# Patient Record
Sex: MALE | Race: ASIAN | NOT HISPANIC OR LATINO | URBAN - METROPOLITAN AREA
[De-identification: names, ages, dates, MRNs, and addresses within clinical notes are randomized per-mention and may not be internally consistent; named-entity substitution may affect disease eponyms.]

---

## 2020-08-18 ENCOUNTER — TELEPHONE ENCOUNTER (OUTPATIENT)
Dept: URBAN - METROPOLITAN AREA SURGERY CENTER 18 | Facility: SURGERY CENTER | Age: 45
End: 2020-08-18

## 2020-08-18 RX ORDER — TENOFOVIR ALAFENAMIDE 25 MG/1
1 TABLET WITH FOOD TABLET ORAL ONCE A DAY
Qty: 90 TABLET | Refills: 3

## 2020-11-03 ENCOUNTER — OFFICE VISIT (OUTPATIENT)
Dept: URBAN - METROPOLITAN AREA TELEHEALTH 2 | Facility: TELEHEALTH | Age: 45
End: 2020-11-03
Payer: COMMERCIAL

## 2020-11-03 DIAGNOSIS — B19.10 HEPATITIS B: ICD-10-CM

## 2020-11-03 DIAGNOSIS — K76.0 FATTY LIVER: ICD-10-CM

## 2020-11-03 DIAGNOSIS — K82.4 GALLBLADDER POLYP: ICD-10-CM

## 2020-11-03 DIAGNOSIS — B18.1 CARRIER OF HEPATITIS B: ICD-10-CM

## 2020-11-03 DIAGNOSIS — N18.9 CHRONIC RENAL INSUFFICIENCY: ICD-10-CM

## 2020-11-03 PROCEDURE — 87517 HEPATITIS B DNA QUANT: CPT | Performed by: INTERNAL MEDICINE

## 2020-11-03 PROCEDURE — G8417 CALC BMI ABV UP PARAM F/U: HCPCS | Performed by: INTERNAL MEDICINE

## 2020-11-03 PROCEDURE — G9903 PT SCRN TBCO ID AS NON USER: HCPCS | Performed by: INTERNAL MEDICINE

## 2020-11-03 PROCEDURE — 99213 OFFICE O/P EST LOW 20 MIN: CPT | Performed by: INTERNAL MEDICINE

## 2020-11-03 PROCEDURE — G8427 DOCREV CUR MEDS BY ELIG CLIN: HCPCS | Performed by: INTERNAL MEDICINE

## 2020-11-03 PROCEDURE — G8483 FLU IMM NO ADMIN DOC REA: HCPCS | Performed by: INTERNAL MEDICINE

## 2020-11-03 PROCEDURE — 1036F TOBACCO NON-USER: CPT | Performed by: INTERNAL MEDICINE

## 2020-11-03 RX ORDER — TENOFOVIR ALAFENAMIDE 25 MG/1
1 TABLET WITH FOOD TABLET ORAL ONCE A DAY
Qty: 90 TABLET | Refills: 3 | Status: ACTIVE | COMMUNITY

## 2021-03-30 ENCOUNTER — TELEPHONE ENCOUNTER (OUTPATIENT)
Dept: URBAN - METROPOLITAN AREA CLINIC 96 | Facility: CLINIC | Age: 46
End: 2021-03-30

## 2021-05-03 ENCOUNTER — LAB OUTSIDE AN ENCOUNTER (OUTPATIENT)
Dept: URBAN - METROPOLITAN AREA TELEHEALTH 2 | Facility: TELEHEALTH | Age: 46
End: 2021-05-03

## 2021-08-10 ENCOUNTER — TELEPHONE ENCOUNTER (OUTPATIENT)
Dept: URBAN - METROPOLITAN AREA CLINIC 98 | Facility: CLINIC | Age: 46
End: 2021-08-10

## 2021-08-10 RX ORDER — TENOFOVIR ALAFENAMIDE 25 MG/1
1 TABLET WITH FOOD TABLET ORAL ONCE A DAY
Qty: 90 | Refills: 0
End: 2021-11-08

## 2021-08-31 ENCOUNTER — OFFICE VISIT (OUTPATIENT)
Dept: URBAN - METROPOLITAN AREA CLINIC 98 | Facility: CLINIC | Age: 46
End: 2021-08-31

## 2021-10-11 ENCOUNTER — OFFICE VISIT (OUTPATIENT)
Dept: URBAN - METROPOLITAN AREA CLINIC 98 | Facility: CLINIC | Age: 46
End: 2021-10-11

## 2021-10-28 ENCOUNTER — TELEPHONE ENCOUNTER (OUTPATIENT)
Dept: URBAN - METROPOLITAN AREA CLINIC 98 | Facility: CLINIC | Age: 46
End: 2021-10-28

## 2021-10-28 RX ORDER — TENOFOVIR ALAFENAMIDE 25 MG/1
1 TABLET WITH FOOD TABLET ORAL ONCE A DAY
Qty: 90 | Refills: 3

## 2021-11-22 ENCOUNTER — OFFICE VISIT (OUTPATIENT)
Dept: URBAN - METROPOLITAN AREA CLINIC 98 | Facility: CLINIC | Age: 46
End: 2021-11-22

## 2022-01-21 ENCOUNTER — DASHBOARD ENCOUNTERS (OUTPATIENT)
Age: 47
End: 2022-01-21

## 2022-01-21 ENCOUNTER — OFFICE VISIT (OUTPATIENT)
Dept: URBAN - METROPOLITAN AREA CLINIC 98 | Facility: CLINIC | Age: 47
End: 2022-01-21
Payer: COMMERCIAL

## 2022-01-21 ENCOUNTER — LAB OUTSIDE AN ENCOUNTER (OUTPATIENT)
Dept: URBAN - METROPOLITAN AREA CLINIC 98 | Facility: CLINIC | Age: 47
End: 2022-01-21

## 2022-01-21 ENCOUNTER — WEB ENCOUNTER (OUTPATIENT)
Dept: URBAN - METROPOLITAN AREA CLINIC 98 | Facility: CLINIC | Age: 47
End: 2022-01-21

## 2022-01-21 VITALS
DIASTOLIC BLOOD PRESSURE: 91 MMHG | HEART RATE: 81 BPM | HEIGHT: 69 IN | SYSTOLIC BLOOD PRESSURE: 128 MMHG | TEMPERATURE: 97.5 F | BODY MASS INDEX: 31.61 KG/M2 | WEIGHT: 213.4 LBS

## 2022-01-21 DIAGNOSIS — B19.10 HEPATITIS B: ICD-10-CM

## 2022-01-21 DIAGNOSIS — Z20.822 EXPOSURE TO COVID-19 VIRUS: ICD-10-CM

## 2022-01-21 DIAGNOSIS — K76.0 FATTY LIVER: ICD-10-CM

## 2022-01-21 DIAGNOSIS — Z12.11 COLON CANCER SCREENING: ICD-10-CM

## 2022-01-21 DIAGNOSIS — K82.4 GALLBLADDER POLYP: ICD-10-CM

## 2022-01-21 DIAGNOSIS — N18.9 CHRONIC RENAL INSUFFICIENCY: ICD-10-CM

## 2022-01-21 PROCEDURE — 99214 OFFICE O/P EST MOD 30 MIN: CPT | Performed by: INTERNAL MEDICINE

## 2022-01-21 RX ORDER — TENOFOVIR ALAFENAMIDE 25 MG/1
1 TABLET WITH FOOD TABLET ORAL ONCE A DAY
Qty: 90 | Refills: 3
End: 2023-01-16

## 2022-01-21 RX ORDER — TENOFOVIR ALAFENAMIDE 25 MG/1
1 TABLET WITH FOOD TABLET ORAL ONCE A DAY
Qty: 90 | Refills: 3 | Status: ACTIVE | COMMUNITY

## 2022-01-21 NOTE — HPI-TODAY'S VISIT:
Last seen in office (telehealth) 11/2020 for [facility] here to follow up ; has not lost weight ; still sedentary no new medical issues reported  COVID vaccinated but no booster yet

## 2022-01-24 LAB
A/G RATIO: 1.4
AFP, SERUM, TUMOR MARKER: 4.2
ALBUMIN: 4.9
ALKALINE PHOSPHATASE: 51
ALT (SGPT): 42
AST (SGOT): 25
BASO (ABSOLUTE): 0.1
BASOS: 1
BILIRUBIN, TOTAL: 0.5
BUN/CREATININE RATIO: 9
BUN: 10
CALCIUM: 9.7
CARBON DIOXIDE, TOTAL: 24
CHLORIDE: 103
CREATININE: 1.13
EGFR IF AFRICN AM: 90
EGFR IF NONAFRICN AM: 78
EOS (ABSOLUTE): 0.1
EOS: 1
GLOBULIN, TOTAL: 3.4
GLUCOSE: 94
HBSAG SCREEN: POSITIVE
HBV IU/ML: (no result)
HEMATOCRIT: 49.5
HEMATOLOGY COMMENTS:: (no result)
HEMOGLOBIN: 16.8
HEPATITIS B SURF AB QUANT: 4.4
IMMATURE CELLS: (no result)
IMMATURE GRANS (ABS): 0
IMMATURE GRANULOCYTES: 0
LOG10 HBV IU/ML: 4.26
LYMPHS (ABSOLUTE): 2.7
LYMPHS: 35
Lab: (no result)
MCH: 30.4
MCHC: 33.9
MCV: 90
MONOCYTES(ABSOLUTE): 0.6
MONOCYTES: 8
NEUTROPHILS (ABSOLUTE): 4.1
NEUTROPHILS: 55
NRBC: (no result)
PLATELETS: 253
POTASSIUM: 4
PROTEIN, TOTAL: 8.3
RBC: 5.53
RDW: 12.7
SARS-COV-2 SEMI-QUANT TOTAL AB: 489
SARS-COV-2 SPIKE AB INTERP: POSITIVE
SODIUM: 141
TEST INFORMATION:: (no result)
WBC: 7.5

## 2022-01-25 ENCOUNTER — TELEPHONE ENCOUNTER (OUTPATIENT)
Dept: URBAN - METROPOLITAN AREA CLINIC 98 | Facility: CLINIC | Age: 47
End: 2022-01-25

## 2022-01-25 PROBLEM — 50167007: Status: ACTIVE | Noted: 2022-01-25

## 2022-02-22 ENCOUNTER — OFFICE VISIT (OUTPATIENT)
Dept: URBAN - METROPOLITAN AREA CLINIC 97 | Facility: CLINIC | Age: 47
End: 2022-02-22

## 2022-03-07 ENCOUNTER — OFFICE VISIT (OUTPATIENT)
Dept: URBAN - METROPOLITAN AREA SURGERY CENTER 18 | Facility: SURGERY CENTER | Age: 47
End: 2022-03-07

## 2022-03-22 ENCOUNTER — OFFICE VISIT (OUTPATIENT)
Dept: URBAN - METROPOLITAN AREA CLINIC 97 | Facility: CLINIC | Age: 47
End: 2022-03-22
Payer: COMMERCIAL

## 2022-03-22 DIAGNOSIS — K76.0 FATTY LIVER: ICD-10-CM

## 2022-03-22 DIAGNOSIS — K82.4 GALLBLADDER POLYP: ICD-10-CM

## 2022-03-22 PROCEDURE — 76700 US EXAM ABDOM COMPLETE: CPT | Performed by: INTERNAL MEDICINE

## 2022-04-18 PROBLEM — 723190009 CHRONIC RENAL INSUFFICIENCY: Status: ACTIVE | Noted: 2020-11-03

## 2022-04-18 PROBLEM — 197321007 FATTY LIVER: Status: ACTIVE | Noted: 2020-11-03

## 2022-04-25 ENCOUNTER — LAB OUTSIDE AN ENCOUNTER (OUTPATIENT)
Dept: URBAN - METROPOLITAN AREA CLINIC 98 | Facility: CLINIC | Age: 47
End: 2022-04-25

## 2022-05-02 ENCOUNTER — OFFICE VISIT (OUTPATIENT)
Dept: URBAN - METROPOLITAN AREA SURGERY CENTER 18 | Facility: SURGERY CENTER | Age: 47
End: 2022-05-02

## 2023-01-31 ENCOUNTER — ERX REFILL RESPONSE (OUTPATIENT)
Dept: URBAN - METROPOLITAN AREA CLINIC 98 | Facility: CLINIC | Age: 48
End: 2023-01-31

## 2023-01-31 RX ORDER — TENOFOVIR ALAFENAMIDE 25 MG/1
1 TABLET WITH FOOD TABLET ORAL ONCE A DAY
Qty: 90 TABLET | Refills: 3 | OUTPATIENT

## 2023-01-31 RX ORDER — TENOFOVIR ALAFENAMIDE 25 MG/1
TAKE 1 TABLET BY MOUTH ONCE DAILY TABLET ORAL
Qty: 30 TABLET | Refills: 0 | OUTPATIENT

## 2023-02-24 ENCOUNTER — TELEPHONE ENCOUNTER (OUTPATIENT)
Dept: URBAN - METROPOLITAN AREA CLINIC 6 | Facility: CLINIC | Age: 48
End: 2023-02-24

## 2023-04-27 ENCOUNTER — TELEPHONE ENCOUNTER (OUTPATIENT)
Dept: URBAN - METROPOLITAN AREA CLINIC 35 | Facility: CLINIC | Age: 48
End: 2023-04-27

## 2024-09-25 ENCOUNTER — OFFICE VISIT (OUTPATIENT)
Dept: URBAN - METROPOLITAN AREA CLINIC 78 | Facility: CLINIC | Age: 49
End: 2024-09-25

## 2024-09-25 RX ORDER — TENOFOVIR ALAFENAMIDE 25 MG/1
1 TABLET WITH FOOD TABLET ORAL ONCE A DAY
Qty: 90 TABLET | Refills: 3 | Status: ACTIVE | COMMUNITY

## 2024-10-16 ENCOUNTER — LAB OUTSIDE AN ENCOUNTER (OUTPATIENT)
Dept: URBAN - METROPOLITAN AREA CLINIC 78 | Facility: CLINIC | Age: 49
End: 2024-10-16

## 2024-10-16 ENCOUNTER — OFFICE VISIT (OUTPATIENT)
Dept: URBAN - METROPOLITAN AREA CLINIC 78 | Facility: CLINIC | Age: 49
End: 2024-10-16
Payer: COMMERCIAL

## 2024-10-16 VITALS
HEART RATE: 79 BPM | SYSTOLIC BLOOD PRESSURE: 115 MMHG | WEIGHT: 210.8 LBS | HEIGHT: 69 IN | DIASTOLIC BLOOD PRESSURE: 80 MMHG | BODY MASS INDEX: 31.22 KG/M2 | TEMPERATURE: 98.1 F | RESPIRATION RATE: 14 BRPM

## 2024-10-16 DIAGNOSIS — Z12.11 COLON CANCER SCREENING: ICD-10-CM

## 2024-10-16 DIAGNOSIS — R10.13 EPIGASTRIC ABDOMINAL PAIN: ICD-10-CM

## 2024-10-16 DIAGNOSIS — B18.1 CHRONIC ACTIVE TYPE B VIRAL HEPATITIS: ICD-10-CM

## 2024-10-16 PROCEDURE — 99214 OFFICE O/P EST MOD 30 MIN: CPT | Performed by: INTERNAL MEDICINE

## 2024-10-16 RX ORDER — TENOFOVIR ALAFENAMIDE 25 MG/1
1 TABLET WITH FOOD TABLET ORAL ONCE A DAY
Qty: 90 TABLET | Refills: 3 | Status: ON HOLD | COMMUNITY

## 2024-10-16 NOTE — HPI-TODAY'S VISIT:
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

## 2024-10-20 LAB
ALBUMIN: 3.9
ALKALINE PHOSPHATASE: 93
ALPHA 2-MACROGLOBULINS, QN: 284
ALT (SGPT) P5P: 128
ALT (SGPT): 103
APOLIPOPROTEIN A-1: 135
AST (SGOT) P5P: 86
AST (SGOT): 76
BASO (ABSOLUTE): 0
BASOS: 1
BILIRUBIN, TOTAL: 0.6
BILIRUBIN, TOTAL: 0.8
BUN/CREATININE RATIO: 11
BUN: 11
CALCIUM: 9.6
CARBON DIOXIDE, TOTAL: 24
CHLORIDE: 102
CHOLESTEROL, TOTAL: 248
COMMENT:: (no result)
CREATININE: 1.04
EGFR: 89
EOS (ABSOLUTE): 0
EOS: 1
FIBROSIS SCORE: 0.8
FIBROSIS SCORING:: (no result)
FIBROSIS STAGE: (no result)
GGT: 105
GLOBULIN, TOTAL: 4.1
GLUCOSE, SERUM: 99
GLUCOSE: 93
HAPTOGLOBIN: 18
HBSAG CONFIRMATION: POSITIVE
HBSAG SCREEN: (no result)
HBV DNA (INTERNATIONAL UNITS): (no result)
HBV LOG10: (no result)
HBV LOG10: 8.84
HEMATOCRIT: 48.5
HEMATOLOGY COMMENTS:: (no result)
HEMOGLOBIN: 16.4
HEP B CORE AB, IGM: NEGATIVE
HEP B CORE AB, TOT: POSITIVE
HEP B SURFACE AB, QUAL: NON REACTIVE
HEPATITIS B QUANTITATION: (no result)
IMMATURE CELLS: (no result)
IMMATURE GRANS (ABS): 0
IMMATURE GRANULOCYTES: 0
INTERPRETATION: (no result)
INTERPRETATIONS:: (no result)
LIMITATIONS:: (no result)
LYMPHS (ABSOLUTE): 1.5
LYMPHS: 38
Lab: (no result)
MCH: 32.3
MCHC: 33.8
MCV: 96
MONOCYTES(ABSOLUTE): 0.4
MONOCYTES: 9
NASH GRADE: (no result)
NASH SCORE: 0.87
NASH SCORING: (no result)
NEUTROPHILS (ABSOLUTE): 2.1
NEUTROPHILS: 51
NRBC: (no result)
PLATELETS: 164
POTASSIUM: 4.5
PROTEIN, TOTAL: 8
RBC: 5.08
RDW: 12.9
RFX TO HBC IGM: (no result)
SODIUM: 139
STEATOSIS GRADE: (no result)
STEATOSIS SCORE: 0.67
STEATOSIS SCORING: (no result)
TEST INFORMATION:: (no result)
TRIGLYCERIDES: 280
WBC: 4

## 2024-10-25 ENCOUNTER — CLAIMS CREATED FROM THE CLAIM WINDOW (OUTPATIENT)
Dept: URBAN - METROPOLITAN AREA SURGERY CENTER 15 | Facility: SURGERY CENTER | Age: 49
End: 2024-10-25

## 2024-10-25 ENCOUNTER — CLAIMS CREATED FROM THE CLAIM WINDOW (OUTPATIENT)
Dept: URBAN - METROPOLITAN AREA CLINIC 4 | Facility: CLINIC | Age: 49
End: 2024-10-25
Payer: COMMERCIAL

## 2024-10-25 DIAGNOSIS — K22.89 OTHER SPECIFIED DISEASE OF ESOPHAGUS: ICD-10-CM

## 2024-10-25 DIAGNOSIS — K31.89 OTHER DISEASES OF STOMACH AND DUODENUM: ICD-10-CM

## 2024-10-25 PROCEDURE — 88312 SPECIAL STAINS GROUP 1: CPT | Performed by: PATHOLOGY

## 2024-10-25 PROCEDURE — 88305 TISSUE EXAM BY PATHOLOGIST: CPT | Performed by: PATHOLOGY
